# Patient Record
Sex: MALE | Race: WHITE | Employment: FULL TIME | URBAN - METROPOLITAN AREA
[De-identification: names, ages, dates, MRNs, and addresses within clinical notes are randomized per-mention and may not be internally consistent; named-entity substitution may affect disease eponyms.]

---

## 2018-05-16 ENCOUNTER — TRANSFERRED RECORDS (OUTPATIENT)
Dept: HEALTH INFORMATION MANAGEMENT | Facility: CLINIC | Age: 28
End: 2018-05-16

## 2019-01-24 ENCOUNTER — TELEPHONE (OUTPATIENT)
Dept: OTHER | Facility: CLINIC | Age: 29
End: 2019-01-24

## 2019-08-27 ENCOUNTER — OFFICE VISIT (OUTPATIENT)
Dept: INTERNAL MEDICINE | Facility: CLINIC | Age: 29
End: 2019-08-27
Payer: COMMERCIAL

## 2019-08-27 VITALS
DIASTOLIC BLOOD PRESSURE: 85 MMHG | HEART RATE: 71 BPM | WEIGHT: 162.6 LBS | BODY MASS INDEX: 21.55 KG/M2 | HEIGHT: 73 IN | SYSTOLIC BLOOD PRESSURE: 123 MMHG | RESPIRATION RATE: 18 BRPM | OXYGEN SATURATION: 98 % | TEMPERATURE: 97.8 F

## 2019-08-27 DIAGNOSIS — J45.909 UNCOMPLICATED ASTHMA, UNSPECIFIED ASTHMA SEVERITY, UNSPECIFIED WHETHER PERSISTENT: ICD-10-CM

## 2019-08-27 DIAGNOSIS — T78.2XXD ANAPHYLAXIS, SUBSEQUENT ENCOUNTER: ICD-10-CM

## 2019-08-27 DIAGNOSIS — Z00.00 WELLNESS EXAMINATION: ICD-10-CM

## 2019-08-27 DIAGNOSIS — Z00.00 WELLNESS EXAMINATION: Primary | ICD-10-CM

## 2019-08-27 DIAGNOSIS — J45.30 MILD PERSISTENT ASTHMA, UNSPECIFIED WHETHER COMPLICATED: ICD-10-CM

## 2019-08-27 RX ORDER — EPINEPHRINE 0.3 MG/.3ML
0.3 INJECTION SUBCUTANEOUS PRN
Qty: 1 EACH | Refills: 3 | Status: SHIPPED | OUTPATIENT
Start: 2019-08-27

## 2019-08-27 RX ORDER — ALBUTEROL SULFATE 90 UG/1
2 AEROSOL, METERED RESPIRATORY (INHALATION) EVERY 6 HOURS
Qty: 1 INHALER | Refills: 11 | Status: SHIPPED | OUTPATIENT
Start: 2019-08-27 | End: 2021-09-10

## 2019-08-27 RX ORDER — FLUTICASONE PROPIONATE 50 MCG
1 SPRAY, SUSPENSION (ML) NASAL DAILY
Qty: 9.9 ML | Refills: 1 | Status: SHIPPED | OUTPATIENT
Start: 2019-08-27 | End: 2019-10-27

## 2019-08-27 RX ORDER — CETIRIZINE HYDROCHLORIDE 10 MG/1
10 TABLET ORAL DAILY
Qty: 30 TABLET | Refills: 1 | Status: SHIPPED | OUTPATIENT
Start: 2019-08-27 | End: 2019-10-31

## 2019-08-27 RX ORDER — FLUTICASONE PROPIONATE 50 MCG
1 SPRAY, SUSPENSION (ML) NASAL DAILY
COMMUNITY
End: 2019-08-27

## 2019-08-27 RX ORDER — CETIRIZINE HYDROCHLORIDE 10 MG/1
10 TABLET ORAL DAILY
COMMUNITY
End: 2019-08-27

## 2019-08-27 ASSESSMENT — PAIN SCALES - GENERAL: PAINLEVEL: NO PAIN (0)

## 2019-08-27 ASSESSMENT — MIFFLIN-ST. JEOR: SCORE: 1756.43

## 2019-08-27 NOTE — PROGRESS NOTES
PRIMARY CARE CENTER         HPI:       Justin Joyce is a 28 year old male with history of mild persistent asthma and previously diagnosed hyperlipidemia (not currently treated) who presents to clinic for: Establish Care and Refill Request    Patient has been getting care from [facility] as he was previously a  at [employer], however, he recently graduated and is now employed as a post-doc here and would like to establish care at the [facility].  He has no acute complaints today, but does note that he is traveling to [place] in a few months with his wife for two weeks and he is wondering what immunizations he might need.      He is in a monogamous relationship with his wife, but would still like to obtain STD testing.  He rides is bike to work for exercise and eats a relatively balanced diet.     He also notes a history of severe peanut allergies, severe environmental allergies for which he has had to get allergy shots in the past and various other allergies.  He carries an epi pen with him.  He recently ate fish accidentally (despite a seafood allergy), so he was wondering if he could get special allergy testing to determine if he is still allergic to seafood.     He was diagnosed with LD in his earlier 20's and was taking a statin for a period of time, however, stopped taking this medicine after repeat lipids were normal.  He thinks he has several male family members on his maternal side who  <51 yo from cardiac issues.     PMHx:  Past Medical History:   Diagnosis Date     Food allergies      Hyperlipidaemia LDL goal < 160      Mild persistent asthma      Pleurisy     hospitalized for 2 days     Seasonal allergies        PSHx:  Past Surgical History:   Procedure Laterality Date     HC TOOTH EXTRACTION W/FORCEP         FamHx:  Family History   Problem Relation Age of Onset     Lipids Father      Asthma Father      Allergies Father      Lipids Paternal Grandmother      Cancer Paternal  Grandmother         ovarian     Allergies Mother      Cancer Maternal Grandmother      CHAR Maternal Grandfather      Cancer Maternal Grandfather         lung     Allergies:     Allergies   Allergen Reactions     Bees Anaphylaxis     Nuts Anaphylaxis     Seafood Anaphylaxis     Orange Hives     Penicillins Unknown     Seasonal Allergies Itching       Meds:    Current Outpatient Medications:      albuterol (PROAIR HFA/PROVENTIL HFA/VENTOLIN HFA) 108 (90 Base) MCG/ACT inhaler, Inhale 2 puffs into the lungs every 6 hours, Disp: 1 Inhaler, Rfl: 11     cetirizine (ZYRTEC) 10 MG tablet, Take 1 tablet (10 mg) by mouth daily, Disp: 30 tablet, Rfl: 1     EPINEPHrine (EPIPEN/ADRENACLICK/OR ANY BX GENERIC EQUIV) 0.3 MG/0.3ML injection 2-pack, Inject 0.3 mLs (0.3 mg) into the muscle as needed for anaphylaxis, Disp: 1 each, Rfl: 3     fluticasone (FLONASE) 50 MCG/ACT nasal spray, Spray 1 spray into both nostrils daily, Disp: 9.9 mL, Rfl: 1     fluticasone-salmeterol (ADVAIR DISKUS) 100-50 MCG/DOSE inhaler, Inhale 1 puff into the lungs every 12 hours, Disp: 1 Inhaler, Rfl: 11  Hydrocortisone OTC (uses on face)    SocHx:  Social History     Socioeconomic History     Marital status: Single     Spouse name: Not on file     Number of children: Not on file     Years of education: Not on file     Highest education level: Not on file   Occupational History     Not on file   Social Needs     Financial resource strain: Not on file     Food insecurity:     Worry: Not on file     Inability: Not on file     Transportation needs:     Medical: Not on file     Non-medical: Not on file   Tobacco Use     Smoking status: Never Smoker     Smokeless tobacco: Never Used   Substance and Sexual Activity     Alcohol use: Yes     Drug use: No     Sexual activity: Not on file   Lifestyle     Physical activity:     Days per week: Not on file     Minutes per session: Not on file     Stress: Not on file   Relationships     Social connections:     Talks on  "phone: Not on file     Gets together: Not on file     Attends Orthodoxy service: Not on file     Active member of club or organization: Not on file     Attends meetings of clubs or organizations: Not on file     Relationship status: Not on file     Intimate partner violence:     Fear of current or ex partner: Not on file     Emotionally abused: Not on file     Physically abused: Not on file     Forced sexual activity: Not on file   Other Topics Concern     Parent/sibling w/ CABG, MI or angioplasty before 65F 55M? Not Asked   Social History Narrative    Moved from [place] 2012. 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       Problem, Medication and Allergy Lists were reviewed and are current.  Patient is a new patient to this clinic and so  I reviewed/updated the Past Medical History, the Family History and the Social History.          Review of Systems:   ROS  I have personally reviewed and updated the complete ROS on the day of the visit.           Physical Exam:   /85 (BP Location: Right arm, Patient Position: Sitting, Cuff Size: Adult Regular)   Pulse 71   Temp 97.8  F (36.6  C) (Oral)   Resp 18   Ht 1.854 m (6' 1\")   Wt 73.8 kg (162 lb 9.6 oz)   SpO2 98%   BMI 21.45 kg/m    Body mass index is 21.45 kg/m .  Vitals were reviewed       GENERAL APPEARANCE: healthy, alert and no distress     EYES: EOMI,  PERRL, wearing prescription glasses     HENT: ear canals and TM's normal and nose and mouth without ulcers or lesions     NECK: no adenopathy, no asymmetry, masses, or scars and thyroid normal to palpation     RESP: lungs clear to auscultation - no rales, rhonchi or wheezes     CV: regular rates and rhythm, normal S1 S2, no S3 or S4 and no murmur, click or rub     ABDOMEN:  soft, nontender, no HSM or masses and bowel sounds normal     MS: extremities normal- no gross deformities noted, no evidence of inflammation in joints, FROM in all extremities.     SKIN: no suspicious lesions or rashes     NEURO: Normal " "strength and tone, sensory exam grossly normal, mentation intact and speech normal     PSYCH: mentation appears normal. and affect normal/bright     LYMPHATICS: No cervical adenopathy        Results:     Office Visit on 06/11/2015   Component Date Value Ref Range Status     Copath Report 06/11/2015    Final                    Value:Patient Name: PRAKASH GODFREY  MR#: [id number]  Specimen #: [id number]  Collected: 6/11/2015  Received: 6/11/2015  Reported: 6/12/2015 15:26  Ordering Phy(s): ROBBIE OH    SPECIMEN(S):  Right flank    FINAL DIAGNOSIS:  Skin, flank, right:       - Angiokeratoma and intracorneal hemorrhage - (see description).    I have personally reviewed all specimens and or slides, including the  listed special stains, and used them with my medical judgement to  determine the final diagnosis.    Electronically signed out by:    Camreon Noble D.O., [facility]    CLINICAL HISTORY:  The patient is a 24-year-old man.    GROSS:  The specimen is received in formalin with proper patient identification,  labeled \"R. Flank\" and the specimen consists of a single, irregular skin  shave measuring 0.9 x 0.7 cm.  The skin surface displays a 0.6 x 0.4 cm  red-tan lesion.  The resection margin is inked black.  It is serially  sectioned and entirely submitted in cassette 1. (Dictated by: Nicky abreu 6/11/2015 05:29 PM)    MICROSCOPIC:  Dilated capillaries are present in the uppermost dermis.  These are  partially enclosed and surrounded by elongated rete ridges.  The  features are those of an angiokeratoma. Additionally, there is prominent  intracorneal hemorrhage and fibrin deposition.  There is no evidence of  malignancy.    CPT Codes:  A: 49559-IE6.T, 80004-KB3.P    TESTING LAB LOCATION:  [facility], [address]   [id number] [phone]    COLLECTION SITE:  Client: [facility]" [facility] [place]  Location: [facility] ([place])         Lab Results   Component Value Date    WBC 7.0 03/28/2015    HGB 13.9 03/28/2015    HCT 41.0 03/28/2015     03/28/2015     03/28/2015    POTASSIUM 3.1 (L) 03/28/2015    CHLORIDE 113 (H) 03/28/2015    CO2 21 03/28/2015    BUN 10 03/28/2015    CR 0.82 03/28/2015    GLC 82 03/28/2015    DD <0.3 03/28/2015       Assessment and Plan     Justin Joyce is a 28 year old male with history of mild persistent asthma and previously diagnosed hyperlipidemia (not currently treated) who presents to clinic to establish care, have a physical and for prescription medicine refill.     Diagnoses and all orders for this visit:    # Wellness examination  Normal physical exam.  Refilled prescriptions.  Counseled on continued healthy living habits.    -     Lipid Profile FASTING; Future  -     CBC with platelets; Future  -     Basic metabolic panel; Future  -     N. gonorrhea PCR - Urine, Clinic Collect  -     Chlamydia trachomatis PCR; Future    # Anaphylaxis, h/o  -     EPINEPHrine (EPIPEN/ADRENACLICK/OR ANY BX GENERIC EQUIV) 0.3 MG/0.3ML injection 2-pack; Inject 0.3 mLs (0.3 mg) into the muscle as needed for anaphylaxis  -     ALLERGY/ASTHMA ADULT REFERRAL    # Mild persistent asthma, unspecified whether complicated  Well controlled on current regimen--uses albuterol inhaler about once per week.      -     albuterol (PROAIR HFA/PROVENTIL HFA/VENTOLIN HFA) 108 (90 Base) MCG/ACT inhaler; Inhale 2 puffs into the lungs every 6 hours  -     cetirizine (ZYRTEC) 10 MG tablet; Take 1 tablet (10 mg) by mouth daily  -     fluticasone (FLONASE) 50 MCG/ACT nasal spray; Spray 1 spray into both nostrils daily  -     fluticasone-salmeterol (ADVAIR DISKUS) 100-50 MCG/DOSE inhaler; Inhale 1 puff into the lungs every 12 hours    # Vaccinations for international travel  Patient going to [place] for 2 weeks.  Since this is less than one month, we discussed that Guamanian  Encephalitis virus and malaria vaccination/ppx would not be necessary.  -     TYPHOID VACCINE, IM  -     HEPATITIS A VACCINE, ADULT    Options for treatment and follow-up care were reviewed with the patient. Justin Joyce engaged in the decision making process and verbalized understanding of the options discussed and agreed with the final plan.    Kings Macias, DO  Aug 27, 2019    Pt was seen and plan of care discussed with Sandra.     Teaching Physician Note:  I was present during the visit and the patient was seen and examined by me.   I discussed the case with the resident and agree with the note as documented by the resident with the following exceptions:  None.    Breana Flores M.D.  Internal Medicine   pager [phone]

## 2019-08-27 NOTE — NURSING NOTE
Chief Complaint   Patient presents with     Establish Care     Refill Request       Terry Ragland CMA (AAMA) at 8:44 AM on 8/27/2019

## 2019-08-27 NOTE — LETTER
Patient:  Justin Joyce  :   1990  MRN:     [id number]        Mr. Justin Joyce  [address]   [place] [address]        September 3, 2019    Dear Mr. Joyce,    Thank you for choosing the [facility] for your healthcare needs.  We appreciate the opportunity to serve you.    The following are your recent test results:  Your gonorrhea test result is negative.     Results for orders placed or performed in visit on 19   N. gonorrhea PCR - Urine, Clinic Collect   Result Value Ref Range    Specimen Descrip Urine     N Gonorrhea PCR Negative NEG^Negative         Please contact your provider if you have any questions or concerns.  We look forward to serving your needs in the future.      Sincerely,    Dr. Flores

## 2019-08-27 NOTE — PROGRESS NOTES
Patient received Hepatitis A vaccine. Vaccine was given under the supervision of Dr. Philippe. See immunization list for administration details. Pt was advised to remain in [facility] for 15 minutes in case of an averse reaction. Given by Trina Clarke CMA, EMT 10:17 AM 8/27/2019    Patient received Typhoid vaccine. Vaccine was given under the supervision of Dr. Philippe. See immunization list for administration details. Pt was advised to remain in [facility] lobby for 15 minutes in case of an averse reaction. Given by Trina Clarke CMA, EMT 10:20 AM 8/27/2019

## 2019-08-28 LAB
C TRACH DNA SPEC QL NAA+PROBE: NEGATIVE
N GONORRHOEA DNA SPEC QL NAA+PROBE: NEGATIVE
SPECIMEN SOURCE: NORMAL
SPECIMEN SOURCE: NORMAL

## 2019-08-29 NOTE — TELEPHONE ENCOUNTER
FUTURE VISIT INFORMATION      FUTURE VISIT INFORMATION:    Date: 10.8.19    Time: 9:00 am    Location:  Allergy  REFERRAL INFORMATION:    Referring provider:  Dr. Kings Macias    Referring providers clinic:  [facility]    Reason for visit/diagnosis:  Anaphylaxis, history of Asthma, appt per Chery at [facility] and pt    RECORDS REQUESTED FROM:       Clinic name Comments Records Status Imaging Status   [facility] 8.27.19 Dr. Macias In Cards Off    [facility] 5.16.18 In [facility]                                Action Radha Corcoran 8.29.19 10:10 am   Action Taken Faxed [facility] for records

## 2019-09-04 ENCOUNTER — TELEPHONE (OUTPATIENT)
Dept: INTERNAL MEDICINE | Facility: CLINIC | Age: 29
End: 2019-09-04

## 2019-09-04 NOTE — TELEPHONE ENCOUNTER
Health Call Center    Phone Message    May a detailed message be left on voicemail: no    Reason for Call: Requesting Results   Name/type of test: Labs done at physical exam  Date of test: 8/27/19  Was test done at a location other than [facility] (Please fill in the location if not [facility])?: No    **Patient requesting a call back to discuss lab results from recent physical. Please contact patient.**    Action Taken: Message routed to:  [facility] ([facility] LEXII

## 2019-09-05 DIAGNOSIS — Z00.00 WELLNESS EXAMINATION: ICD-10-CM

## 2019-09-05 LAB
ANION GAP SERPL CALCULATED.3IONS-SCNC: 6 MMOL/L (ref 3–14)
BUN SERPL-MCNC: 15 MG/DL (ref 7–30)
CALCIUM SERPL-MCNC: 9.4 MG/DL (ref 8.5–10.1)
CHLORIDE SERPL-SCNC: 107 MMOL/L (ref 94–109)
CHOLEST SERPL-MCNC: 261 MG/DL
CO2 SERPL-SCNC: 27 MMOL/L (ref 20–32)
CREAT SERPL-MCNC: 0.91 MG/DL (ref 0.66–1.25)
ERYTHROCYTE [DISTWIDTH] IN BLOOD BY AUTOMATED COUNT: 12.8 % (ref 10–15)
GFR SERPL CREATININE-BSD FRML MDRD: >90 ML/MIN/{1.73_M2}
GLUCOSE SERPL-MCNC: 87 MG/DL (ref 70–99)
HCT VFR BLD AUTO: 49.2 % (ref 40–53)
HDLC SERPL-MCNC: 52 MG/DL
HGB BLD-MCNC: 16.2 G/DL (ref 13.3–17.7)
LDLC SERPL CALC-MCNC: 187 MG/DL
MCH RBC QN AUTO: 28.2 PG (ref 26.5–33)
MCHC RBC AUTO-ENTMCNC: 32.9 G/DL (ref 31.5–36.5)
MCV RBC AUTO: 86 FL (ref 78–100)
NONHDLC SERPL-MCNC: 209 MG/DL
PLATELET # BLD AUTO: 195 10E9/L (ref 150–450)
POTASSIUM SERPL-SCNC: 4.2 MMOL/L (ref 3.4–5.3)
RBC # BLD AUTO: 5.74 10E12/L (ref 4.4–5.9)
SODIUM SERPL-SCNC: 140 MMOL/L (ref 133–144)
TRIGL SERPL-MCNC: 111 MG/DL
WBC # BLD AUTO: 6.5 10E9/L (ref 4–11)

## 2019-09-05 NOTE — TELEPHONE ENCOUNTER
Called and advised negative. Todays labs are not  Reviewed by MD yet. Charo Lindsey Paramedic on 9/5/2019 at 10:14 AM

## 2019-10-08 ENCOUNTER — PRE VISIT (OUTPATIENT)
Dept: ALLERGY | Facility: CLINIC | Age: 29
End: 2019-10-08

## 2019-10-08 DIAGNOSIS — J45.30 MILD PERSISTENT ASTHMA, UNSPECIFIED WHETHER COMPLICATED: Primary | ICD-10-CM

## 2019-10-08 LAB
DLCOUNC-%PRED-PRE: 100 %
DLCOUNC-PRE: 36.73 ML/MIN/MMHG
DLCOUNC-PRED: 36.41 ML/MIN/MMHG
ERV-%PRED-PRE: 96 %
ERV-PRE: 2.24 L
ERV-PRED: 2.33 L
EXPTIME-PRE: 7.12 SEC
FEF2575-%PRED-PRE: 94 %
FEF2575-PRE: 4.76 L/SEC
FEF2575-PRED: 5.02 L/SEC
FEFMAX-%PRED-PRE: 120 %
FEFMAX-PRE: 13.29 L/SEC
FEFMAX-PRED: 11.06 L/SEC
FEV1-%PRED-PRE: 99 %
FEV1-PRE: 4.98 L
FEV1FEV6-PRE: 82 %
FEV1FEV6-PRED: 83 %
FEV1FVC-PRE: 82 %
FEV1FVC-PRED: 82 %
FEV1SVC-PRE: 84 %
FEV1SVC-PRED: 80 %
FIFMAX-PRE: 10.58 L/SEC
FRCPLETH-%PRED-PRE: 106 %
FRCPLETH-PRE: 3.77 L
FRCPLETH-PRED: 3.54 L
FVC-%PRED-PRE: 98 %
FVC-PRE: 6.07 L
FVC-PRED: 6.15 L
IC-%PRED-PRE: 92 %
IC-PRE: 3.71 L
IC-PRED: 3.99 L
RVPLETH-%PRED-PRE: 82 %
RVPLETH-PRE: 1.54 L
RVPLETH-PRED: 1.85 L
TLCPLETH-%PRED-PRE: 95 %
TLCPLETH-PRE: 7.48 L
TLCPLETH-PRED: 7.84 L
VA-%PRED-PRE: 100 %
VA-PRE: 7.49 L
VC-%PRED-PRE: 94 %
VC-PRE: 5.94 L
VC-PRED: 6.32 L

## 2019-10-31 DIAGNOSIS — J45.30 MILD PERSISTENT ASTHMA, UNSPECIFIED WHETHER COMPLICATED: ICD-10-CM

## 2019-10-31 RX ORDER — CETIRIZINE HYDROCHLORIDE 10 MG/1
TABLET ORAL
Qty: 30 TABLET | Refills: 1 | Status: SHIPPED | OUTPATIENT
Start: 2019-10-31 | End: 2021-04-22

## 2019-10-31 NOTE — TELEPHONE ENCOUNTER
cetirizine (ZYRTEC) 10 MG tablet      Last Written Prescription Date:  8/27/19  Last Fill Quantity: 30,   # refills: 1  Last Office Visit : 8/27/19  Future Office visit: none    Refill to pharmacy.

## 2020-03-02 ENCOUNTER — HEALTH MAINTENANCE LETTER (OUTPATIENT)
Age: 30
End: 2020-03-02

## 2020-07-06 ENCOUNTER — MYC REFILL (OUTPATIENT)
Dept: INTERNAL MEDICINE | Facility: CLINIC | Age: 30
End: 2020-07-06

## 2020-07-06 DIAGNOSIS — J45.30 MILD PERSISTENT ASTHMA, UNSPECIFIED WHETHER COMPLICATED: ICD-10-CM

## 2020-07-06 RX ORDER — ALBUTEROL SULFATE 90 UG/1
2 AEROSOL, METERED RESPIRATORY (INHALATION) EVERY 6 HOURS
Qty: 1 INHALER | Refills: 11 | Status: CANCELLED | OUTPATIENT
Start: 2020-07-06

## 2020-07-28 ENCOUNTER — VIRTUAL VISIT (OUTPATIENT)
Dept: FAMILY MEDICINE | Facility: OTHER | Age: 30
End: 2020-07-28
Payer: COMMERCIAL

## 2020-07-28 PROCEDURE — 99421 OL DIG E/M SVC 5-10 MIN: CPT | Performed by: PHYSICIAN ASSISTANT

## 2020-07-28 NOTE — PROGRESS NOTES
"Date: 2020 11:49:32  Clinician: Demond Mendez  Clinician NPI: [id number]  Patient: Justin Joyce  Patient : 1990  Patient Address: [facility] [address], [place] [address]  Patient Phone: [phone]  Visit Protocol: URI  Patient Summary:  Justin is a 29 year old ( : 1990 ) male who initiated a Visit for COVID-19 (Coronavirus) evaluation and screening. When asked the question \"Please sign me up to receive news, health information and promotions from [employer].\", Justin responded \"No\".    Justin states his symptoms started 1-2 days ago.   His symptoms consist of nasal congestion, rhinitis, malaise, and chills.   Symptom details   Nasal secretions: The color of his mucus is clear and white.   Justin denies having wheezing, nausea, teeth pain, ageusia, diarrhea, myalgias, sore throat, anosmia, facial pain or pressure, fever, cough, vomiting, ear pain, headache, and enlarged lymph nodes. He also denies having recent facial or sinus surgery in the past 60 days and taking antibiotic medication in the past month. He is not experiencing dyspnea.   Precipitating events  He has not recently been exposed to someone with influenza. Justin has not been in close contact with any high risk individuals.   Pertinent COVID-19 (Coronavirus) information  In the past 14 days, Justin has not worked in a congregate living setting.   He does not work or volunteer as healthcare worker or a  and does not work or volunteer in a healthcare facility.   Justin also has not lived in a congregate living setting in the past 14 days. He does not live with a healthcare worker.   Justin has not had a close contact with a laboratory-confirmed COVID-19 patient within 14 days of symptom onset.   Pertinent medical history  Justin needs a return to work/school note.   Weight: 160 lbs   Justin does not smoke or use smokeless tobacco.   Weight: 160 lbs    MEDICATIONS: albuterol sulfate inhalation, cetirizine " "oral, Advair Diskus inhalation, ALLERGIES: Penicillins  Clinician Response:  Dear Justin,   Your symptoms show that you may have coronavirus (COVID-19). This illness can cause fever, cough and trouble breathing. Many people get a mild case and get better on their own. Some people can get very sick.  What should I do?  We would like to test you for this virus.   1. Please call [phone] to schedule your visit. Explain that you were referred by [facility] to have a COVID-19 test. Be ready to share your [facility] visit ID number.  The following will serve as your written order for this COVID Test, ordered by me, for the indication of suspected COVID [Z20.828]: The test will be ordered in [employer], our electronic health record, after you are scheduled. It will show as ordered and authorized by Otilio Broussard MD.  Order: COVID-19 (Coronavirus) PCR for SYMPTOMATIC testing from [facility].      2. When it's time for your COVID test:  Stay at least 6 feet away from others. (If someone will drive you to your test, stay in the backseat, as far away from the  as you can.)   Cover your mouth and nose with a mask, tissue or washcloth.  Go straight to the testing site. Don't make any stops on the way there or back.      3.Starting now: Stay home and away from others (self-isolate) until:   You've had no fever---and no medicine that reduces fever---for 3 full days (72 hours). And...   Your other symptoms have gotten better. For example, your cough or breathing has improved. And...   At least 10 days have passed since your symptoms started.       During this time, don't leave the house except for testing or medical care.   Stay in your own room, even for meals. Use your own bathroom if you can.   Stay away from others in your home. No hugging, kissing or shaking hands. No visitors.  Don't go to work, school or anywhere else.    Clean \"high touch\" surfaces often (doorknobs, counters, handles, etc.). Use a household cleaning spray or wipes. " You'll find a full list of  on the EPA website: www.epa.gov/pesticide-registration/list-n-disinfectants-use-against-sars-cov-2.   Cover your mouth and nose with a mask, tissue or washcloth to avoid spreading germs.  Wash your hands and face often. Use soap and water.  Caregivers in these groups are at risk for severe illness due to COVID-19:  o People 65 years and older  o People who live in a nursing home or long-term care facility  o People with chronic disease (lung, heart, cancer, diabetes, kidney, liver, immunologic)  o People who have a weakened immune system, including those who:   Are in cancer treatment  Take medicine that weakens the immune system, such as corticosteroids  Had a bone marrow or organ transplant  Have an immune deficiency  Have poorly controlled HIV or AIDS  Are obese (body mass index of 40 or higher)  Smoke regularly   o Caregivers should wear gloves while washing dishes, handling laundry and cleaning bedrooms and bathrooms.  o Use caution when washing and drying laundry: Don't shake dirty laundry, and use the warmest water setting that you can.  o For more tips, go to www.cdc.gov/coronavirus/2019-ncov/downloads/10Things.pdf.    4.Sign up for [employer]. We know it's scary to hear that you might have COVID-19. We want to track your symptoms to make sure you're okay over the next 2 weeks. Please look for an email from [employer]---this is a free, online program that we'll use to keep in touch. To sign up, follow the link in the email. Learn more at [email or web address]  How can I take care of myself?   Get lots of rest. Drink extra fluids (unless a doctor has told you not to).   Take Tylenol (acetaminophen) for fever or pain. If you have liver or kidney problems, ask your family doctor if it's okay to take Tylenol.   Adults can take either:    650 mg (two 325 mg pills) every 4 to 6 hours, or...   1,000 mg (two 500 mg pills) every 8 hours as needed.    Note: Don't  take more than 3,000 mg in one day. Acetaminophen is found in many medicines (both prescribed and over-the-counter medicines). Read all labels to be sure you don't take too much.   For children, check the Tylenol bottle for the right dose. The dose is based on the child's age or weight.    If you have other health problems (like cancer, heart failure, an organ transplant or severe kidney disease): Call your specialty clinic if you don't feel better in the next 2 days.       Know when to call 911. Emergency warning signs include:    Trouble breathing or shortness of breath Pain or pressure in the chest that doesn't go away Feeling confused like you haven't felt before, or not being able to wake up Bluish-colored lips or face.  Where can I get more information?   [facility] -- About COVID-19: [email or web address]   CDC -- What to Do If You're Sick: www.cdc.gov/coronavirus/2019-ncov/about/steps-when-sick.html   CDC -- Ending Home Isolation: www.cdc.gov/coronavirus/2019-ncov/hcp/disposition-in-home-patients.html   CDC -- Caring for Someone: www.cdc.gov/coronavirus/2019-ncov/if-you-are-sick/care-for-someone.html   [facility] -- Interim Guidance for Hospital Discharge to Home: www.health.[facility].[place]/diseases/coronavirus/hcp/hospdischarge.pdf   [facility] clinical trials (COVID-19 research studies): clinicalaffairs.[facility]-clinical-trials    Below are the COVID-19 hotlines at the [facility] of [facility] ([facility]). Interpreters are available.    For health questions: Call 649-337-9651 or 1-845.990.1981 (7 a.m. to 7 p.m.) For questions about schools and childcare: Call 266-863-8582 or 1-546.290.4277 (7 a.m. to 7 p.m.)    Diagnosis: Nasal congestion  Diagnosis ICD: R09.81

## 2020-07-29 DIAGNOSIS — Z20.822 SUSPECTED 2019 NOVEL CORONAVIRUS INFECTION: Primary | ICD-10-CM

## 2020-07-30 LAB
SARS-COV-2 RNA SPEC QL NAA+PROBE: NOT DETECTED
SPECIMEN SOURCE: NORMAL

## 2020-09-28 DIAGNOSIS — J45.30 MILD PERSISTENT ASTHMA, UNSPECIFIED WHETHER COMPLICATED: ICD-10-CM

## 2020-09-30 NOTE — TELEPHONE ENCOUNTER
fluticasone-salmeterol (ADVAIR DISKUS) 100-50 MCG/DOSE inhaler   Last Written Prescription Date:  10/29/2019  Last Fill Quantity: 16,   # refills: 3  Last Office Visit : 8/27/2019  Future Office visit:  10/6/2020  1 Inhaler and 1 Refill sent to pharm 9/30/2020      Jeanine Lloyd RN  Central Triage Red Flags/Med Refills

## 2020-10-06 ENCOUNTER — OFFICE VISIT (OUTPATIENT)
Dept: INTERNAL MEDICINE | Facility: CLINIC | Age: 30
End: 2020-10-06
Payer: COMMERCIAL

## 2020-10-06 VITALS
BODY MASS INDEX: 21.34 KG/M2 | OXYGEN SATURATION: 99 % | HEIGHT: 73 IN | DIASTOLIC BLOOD PRESSURE: 85 MMHG | HEART RATE: 60 BPM | SYSTOLIC BLOOD PRESSURE: 131 MMHG | WEIGHT: 161 LBS

## 2020-10-06 DIAGNOSIS — N52.9 ERECTILE DYSFUNCTION, UNSPECIFIED ERECTILE DYSFUNCTION TYPE: ICD-10-CM

## 2020-10-06 DIAGNOSIS — Z00.00 ROUTINE GENERAL MEDICAL EXAMINATION AT A HEALTH CARE FACILITY: Primary | ICD-10-CM

## 2020-10-06 PROCEDURE — 99395 PREV VISIT EST AGE 18-39: CPT | Mod: 25 | Performed by: STUDENT IN AN ORGANIZED HEALTH CARE EDUCATION/TRAINING PROGRAM

## 2020-10-06 RX ORDER — SILDENAFIL 25 MG/1
25 TABLET, FILM COATED ORAL DAILY PRN
Status: CANCELLED | OUTPATIENT
Start: 2020-10-06

## 2020-10-06 RX ORDER — FLUORIDE TOOTHPASTE
237 TOOTHPASTE DENTAL 4 TIMES DAILY
Qty: 1 BOTTLE | Refills: 0 | Status: CANCELLED | OUTPATIENT
Start: 2020-10-06

## 2020-10-06 ASSESSMENT — MIFFLIN-ST. JEOR: SCORE: 1744.17

## 2020-10-06 ASSESSMENT — PAIN SCALES - GENERAL: PAINLEVEL: NO PAIN (0)

## 2020-10-06 NOTE — PROGRESS NOTES
[facility]         HPI:       Justin Joyce is a 29 year old male who presents to clinic for: Physical    Patient has 2 main concerns today.  First concern is that he sleeps with his mouth wide open.  He states that his wife is very concerned about how wide open his mouth is, and indeed he feels like he has a dry mouth in the morning.  He says that his dentist thinks that he has early periodontal disease.  Apparently his dentist did not provide any advice on strategies to address mouth breathing during sleep.  Patient has actually bought himself a head strap over the Internet but this does not fit well and has not helped.  He already wears a mouthguard at night.    His second issue is erectile dysfunction.  He states that over the past year he has had increasingly difficult time achieving and maintaining erections.  He has actually had intermittent problems with this for the past 7 years roughlt.  He is able to maintain an erection sufficient for sexual intercourse with his wife only about 50% of the time.  He also notes that sometimes he will have a half flaccid penis but still be able to ejaculate, which worries him.  He does have morning erections.  He Tried sildenafil intermittently for one year about 6 or 7 years ago.  This did help.  He is not interested in sildenafil anymore.  He suspects that there is a significant psychological component to his erectile dysfunction.  He and his wife go to couples counseling and discussed this issue as part of that.  They tend to have sex about twice per week.    Patient denies significant fatigue, headache, visual changes, hearing problems, headache, fever, chills, chest pain, shortness of breath, abdominal pain, dysuria, pain with urination, painful erections, pain with ejaculation, deviated erections, constipation or diarrhea, numbness or tingling in his feet or hands.      PMHx:  Past Medical History:   Diagnosis Date     Food allergies       Hyperlipidaemia LDL goal < 160      Mild persistent asthma      Pleurisy 2012    hospitalized for 2 days     Seasonal allergies        PSHx:  Past Surgical History:   Procedure Laterality Date     HC TOOTH EXTRACTION W/FORCEP         FamHx:  Family History   Problem Relation Age of Onset     Lipids Father      Asthma Father      Allergies Father      Lipids Paternal Grandmother      Cancer Paternal Grandmother         ovarian     Allergies Mother      Cancer Maternal Grandmother      [email or web address].DSb Maternal Grandfather      Cancer Maternal Grandfather         lung       Allergies:     Allergies   Allergen Reactions     Bees Anaphylaxis     Nuts Anaphylaxis     Seafood Anaphylaxis     Orange Hives     Penicillins Unknown     Seasonal Allergies Itching       Meds:    Current Outpatient Medications:      albuterol (PROAIR HFA/PROVENTIL HFA/VENTOLIN HFA) 108 (90 Base) MCG/ACT inhaler, Inhale 2 puffs into the lungs every 6 hours, Disp: 1 Inhaler, Rfl: 11     cetirizine (ZYRTEC) 10 MG tablet, TAKE 1 TABLET BY MOUTH EVERY DAY, Disp: 30 tablet, Rfl: 1     EPINEPHrine (EPIPEN/ADRENACLICK/OR ANY BX GENERIC EQUIV) 0.3 MG/0.3ML injection 2-pack, Inject 0.3 mLs (0.3 mg) into the muscle as needed for anaphylaxis, Disp: 1 each, Rfl: 3     fluticasone (FLONASE) 50 MCG/ACT nasal spray, Spray 1 spray into both nostrils daily, Disp: 16 mL, Rfl: 3     fluticasone-salmeterol (ADVAIR DISKUS) 100-50 MCG/DOSE inhaler, Inhale 1 puff into the lungs every 12 hours *Please keep visit with PCP on 10/6/2020 for further refills*  Thank you, Disp: 1 Inhaler, Rfl: 1    SocHx:  Social History     Socioeconomic History     Marital status: Single     Spouse name: None     Number of children: None     Years of education: None     Highest education level: None   Occupational History     None   Social Needs     Financial resource strain: None     Food insecurity     Worry: None     Inability: None     Transportation needs     Medical: None     Non-medical: None  "  Tobacco Use     Smoking status: Never Smoker     Smokeless tobacco: Never Used   Substance and Sexual Activity     Alcohol use: Yes     Drug use: No     Sexual activity: None   Lifestyle     Physical activity     Days per week: None     Minutes per session: None     Stress: None   Relationships     Social connections     Talks on phone: None     Gets together: None     Attends Yazidi service: None     Active member of club or organization: None     Attends meetings of clubs or organizations: None     Relationship status: None     Intimate partner violence     Fear of current or ex partner: None     Emotionally abused: None     Physically abused: None     Forced sexual activity: None   Other Topics Concern     Parent/sibling w/ CABG, MI or angioplasty before 65F 55M? Not Asked   Social History Narrative    Moved from [place] 2012. 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       Problem, Medication and Allergy Lists were reviewed and are current.  Patient is an established patient of this clinic.         Review of Systems:   LEIA  I have personally reviewed and updated the complete ROS on the day of the visit.           Physical Exam:   /85   Pulse 60   Ht 1.854 m (6' 1\")   Wt 73 kg (161 lb)   SpO2 99%   BMI 21.24 kg/m    Body mass index is 21.24 kg/m .  Vitals were reviewed       GENERAL APPEARANCE: healthy, alert and no distress     EYES: EOMI,  PERRL     HENT: ear canals and TM's normal and nose and mouth without ulcers or lesions     NECK: no adenopathy, no asymmetry, masses, or scars and thyroid normal to palpation     RESP: lungs clear to auscultation - no rales, rhonchi or wheezes     CV: regular rates and rhythm, normal S1 S2, no S3 or S4 and no murmur, click or rub     ABDOMEN:  soft, nontender, no HSM or masses and bowel sounds normal     GU_male: testicles normal without atrophy or masses and penis normal without urethral discharge     MS: extremities normal- no gross deformities noted, no " evidence of inflammation in joints, FROM in all extremities.     SKIN: no suspicious lesions or rashes     NEURO: Normal strength and tone, sensory exam grossly normal, mentation intact and speech normal     PSYCH: mentation appears normal. and affect normal/bright     LYMPHATICS: No cervical adenopathy        Results:     Orders Only on 07/29/2020   Component Date Value Ref Range Status     COVID-19 Virus PCR to [facility] - So* 07/29/2020 Nasopharyngeal   Final     COVID-19 Virus PCR to [facility] - Re* 07/29/2020 Not Detected   Final    Comment: Collection of multiple specimens from the same patient may be necessary to   detect the virus. The possibility of a false negative should be considered if   the patient's recent exposure or clinical presentation suggests 2019 nCOV   infection and diagnostic tests for other causes of illness are negative.   Repeat testing may be considered in this setting.  Viral RNA was extracted via a validated method and subsequently underwent   single step reverse transcriptase-real time polymerase chain reaction using   primers to the CDC specified N1,N2 gene targets of CoV2 and human RNP as an   internal control.  A negative result does not rule out the presence of real-time PCR inhibitors   in the specimen or COVID-19 RNA in concentrations below the limit of detection   of the assay. The possibility of a false negative should be considered if the   patients recent exposure or clinical presentation suggests COVID-19.   Additional testing or repeat testing requires consultation with the   laborator                           y.  Nasopharyngeal specimen is the preferred choice for swab-based SARS CoV2   testing. When collection of a nasopharyngeal swab is not possible the   following are acceptable alternatives:  an oropharyngeal (OP) specimen collected by a healthcare professional, or a   nasal mid-turbinate (NMT) swab collected by a healthcare professional or by   onsite self-collection  (using a flocked tapered swab), or an anterior nares   specimen collected by a healthcare professional or by onsite self-collection   (using a round foam swab). (Centers for Disease Control)  Testing performed by [facility], Room [address], [address] [place] [address], [place] [address]. This test was developed and its   performance characteristics determined by the [facility]. It has not been cleared or approved by the FDA.  The laboratory is regulated under the Clinical Laboratory Improvement   Amendments of 1988 (CLIA-88) as qualified to perform high-complexity testin                           g.   This test is used for clinical purposes. It should not be regarded as   investigational or for research.         Lab Results   Component Value Date    WBC 6.6 10/07/2020    HGB 15.8 10/07/2020    HCT 47.1 10/07/2020     10/07/2020     10/07/2020    POTASSIUM 4.3 10/07/2020    CHLORIDE 107 10/07/2020    CO2 29 10/07/2020    BUN 14 10/07/2020    CR 1.00 10/07/2020    GLC 91 10/07/2020    DD <0.3 03/28/2015    AST 19 10/07/2020    ALT 27 10/07/2020    ALKPHOS 70 10/07/2020    BILITOTAL 0.8 10/07/2020       Assessment and Plan     Justin was seen today for physical.    Diagnoses and all orders for this visit:  Routine General Exam    Erectile dysfunction, unspecified erectile dysfunction type  Do not suspect that there was an organic etiology at play here.  Will obtain basic laboratory work-up, and patient will continue to see therapist.  He is uninterested in medication at this time.  -     Lipid panel reflex to direct LDL Fasting; Future  -     Testosterone Free and Total; Future  -     Comprehensive metabolic panel; Future  -     CBC with platelets; Future  -     TSH with free T4 reflex; Future    Options for treatment and follow-up care were reviewed with the patient. Justin Joyce engaged in the decision making process and verbalized understanding  of the options discussed and agreed with the final plan.    Kings Macias, DO  Oct 6, 2020    Pt was seen and plan of care discussed with Dr. Farnsworth.     While the patient was in clinic, I reviewed the pertinent medical history and results.  I discussed the current findings on physical examination, as well as the patient s diagnosis and treatment plan with the resident and agree with the information as documented with the following exceptions: none.  Judie Farnsworth MD  Internal Medicine

## 2020-10-06 NOTE — NURSING NOTE
Chief Complaint   Patient presents with     Physical     Bel Gillis LPN at 10:17 AM on 10/6/2020.

## 2020-10-07 DIAGNOSIS — N52.9 ERECTILE DYSFUNCTION, UNSPECIFIED ERECTILE DYSFUNCTION TYPE: ICD-10-CM

## 2020-10-07 LAB
ALBUMIN SERPL-MCNC: 4 G/DL (ref 3.4–5)
ALP SERPL-CCNC: 70 U/L (ref 40–150)
ALT SERPL W P-5'-P-CCNC: 27 U/L (ref 0–70)
ANION GAP SERPL CALCULATED.3IONS-SCNC: 4 MMOL/L (ref 3–14)
AST SERPL W P-5'-P-CCNC: 19 U/L (ref 0–45)
BILIRUB SERPL-MCNC: 0.8 MG/DL (ref 0.2–1.3)
BUN SERPL-MCNC: 14 MG/DL (ref 7–30)
CALCIUM SERPL-MCNC: 9.3 MG/DL (ref 8.5–10.1)
CHLORIDE SERPL-SCNC: 107 MMOL/L (ref 94–109)
CHOLEST SERPL-MCNC: 239 MG/DL
CO2 SERPL-SCNC: 29 MMOL/L (ref 20–32)
CREAT SERPL-MCNC: 1 MG/DL (ref 0.66–1.25)
ERYTHROCYTE [DISTWIDTH] IN BLOOD BY AUTOMATED COUNT: 12.7 % (ref 10–15)
GFR SERPL CREATININE-BSD FRML MDRD: >90 ML/MIN/{1.73_M2}
GLUCOSE SERPL-MCNC: 91 MG/DL (ref 70–99)
HCT VFR BLD AUTO: 47.1 % (ref 40–53)
HDLC SERPL-MCNC: 55 MG/DL
HGB BLD-MCNC: 15.8 G/DL (ref 13.3–17.7)
LDLC SERPL CALC-MCNC: 159 MG/DL
MCH RBC QN AUTO: 28.9 PG (ref 26.5–33)
MCHC RBC AUTO-ENTMCNC: 33.5 G/DL (ref 31.5–36.5)
MCV RBC AUTO: 86 FL (ref 78–100)
NONHDLC SERPL-MCNC: 184 MG/DL
PLATELET # BLD AUTO: 182 10E9/L (ref 150–450)
POTASSIUM SERPL-SCNC: 4.3 MMOL/L (ref 3.4–5.3)
PROT SERPL-MCNC: 7.5 G/DL (ref 6.8–8.8)
RBC # BLD AUTO: 5.47 10E12/L (ref 4.4–5.9)
SODIUM SERPL-SCNC: 140 MMOL/L (ref 133–144)
TRIGL SERPL-MCNC: 127 MG/DL
TSH SERPL DL<=0.005 MIU/L-ACNC: 1.81 MU/L (ref 0.4–4)
WBC # BLD AUTO: 6.6 10E9/L (ref 4–11)

## 2020-10-07 PROCEDURE — 80061 LIPID PANEL: CPT | Performed by: PATHOLOGY

## 2020-10-07 PROCEDURE — 99000 SPECIMEN HANDLING OFFICE-LAB: CPT | Performed by: PATHOLOGY

## 2020-10-07 PROCEDURE — 80053 COMPREHEN METABOLIC PANEL: CPT | Performed by: PATHOLOGY

## 2020-10-07 PROCEDURE — 84270 ASSAY OF SEX HORMONE GLOBUL: CPT | Mod: 90 | Performed by: PATHOLOGY

## 2020-10-07 PROCEDURE — 84443 ASSAY THYROID STIM HORMONE: CPT | Performed by: PATHOLOGY

## 2020-10-07 PROCEDURE — 85027 COMPLETE CBC AUTOMATED: CPT | Performed by: PATHOLOGY

## 2020-10-07 PROCEDURE — 84403 ASSAY OF TOTAL TESTOSTERONE: CPT | Mod: 90 | Performed by: PATHOLOGY

## 2020-10-07 ASSESSMENT — ASTHMA QUESTIONNAIRES: ACT_TOTALSCORE: 23

## 2020-10-08 ENCOUNTER — VIRTUAL VISIT (OUTPATIENT)
Dept: FAMILY MEDICINE | Facility: OTHER | Age: 30
End: 2020-10-08

## 2020-10-08 LAB
SHBG SERPL-SCNC: 34 NMOL/L (ref 11–80)
TESTOST FREE SERPL-MCNC: 15.89 NG/DL (ref 4.7–24.4)
TESTOST SERPL-MCNC: 721 NG/DL (ref 240–950)

## 2020-10-08 NOTE — PROGRESS NOTES
"Date: 10/08/2020 17:28:39  Clinician: Juan Ramirez  Clinician NPI: [id number]  Patient: Justin Joyce  Patient : 1990  Patient Address: [facility] [address], [place] [address]  Patient Phone: [phone]  Visit Protocol: URI  Patient Summary:  Justin is a 29 year old ( : 1990 ) male who initiated a [facility] Visit for COVID-19 (Coronavirus) evaluation and screening. When asked the question \"Please sign me up to receive news, health information and promotions. \", Justin responded \"No\".    When asked when his symptoms started, Justin reported that he does not have any symptoms.   He denies taking antibiotic medication in the past month and having recent facial or sinus surgery in the past 60 days.    Pertinent COVID-19 (Coronavirus) information  In the past 14 days, Justin has not worked in a congregate living setting.   He does not work or volunteer as healthcare worker or a  and does not work or volunteer in a healthcare facility.   Justin also has not lived in a congregate living setting in the past 14 days. He does not live with a healthcare worker.   Justin has not had a close contact with a laboratory-confirmed COVID-19 patient within the last 14 days.   Since 2019, Justin and has had upper respiratory infection (URI) or influenza-like illness. Has not been diagnosed with lab-confirmed COVID-19 test      Date(s) of previous URI or influenza-like illness (free-text): February 15, 2020     Symptoms Justin experienced during previous URI or influenza-like illness as reported by the patient (free-text): Fever, Cough, Fatigue        Pertinent medical history  Justin needs a return to work/school note.   Weight: 162 lbs   Justin does not smoke or use smokeless tobacco.   Weight: 162 lbs    MEDICATIONS: albuterol sulfate inhalation, cetirizine oral, Advair Diskus inhalation, ALLERGIES: Penicillins  Clinician Response:  Deahero Anderson,   Based on the details you've " shared, you are concerned about contact with someone who may have been exposed to coronavirus (COVID-19). Based on [facility] guidelines you do not need to be tested at this time.  Testing for people who do not have symptoms is only required in certain instances, including direct contact with a person who has tested positive for COVID-19, or for those who are traveling to locations where testing is required beforehand.  Please redo an [facility] visit or call your clinic if you think we missed needed information, your exposure information has changed, or you develop symptoms.  What are the symptoms of COVID-19?  The most common symptoms are cough, fever and trouble breathing. Less common symptoms include headache, body aches, fatigue (feeling very tired), chills, sore throat, stuffy or runny nose, diarrhea (loose poop), loss of taste or smell, belly pain, and nausea or vomiting (feeling sick to your stomach or throwing up).  Where can I get more information?  [facility] -- About COVID-19: [email or web address]  CDC -- What to Do If You're Sick: www.cdc.gov/coronavirus/2019-ncov/about/steps-when-sick.html  CDC -- Ending Home Isolation: www.cdc.gov/coronavirus/2019-ncov/hcp/disposition-in-home-patients.html  [facility] -- Caring for Someone: www.cdc.gov/coronavirus/2019-ncov/if-you-are-sick/care-for-someone.html  [facility] -- Interim Guidance for Hospital Discharge to Home: www.health.[facility].[place]/diseases/coronavirus/hcp/hospdischarge.pdf  [facility] clinical trials (COVID-19 research studies): clinicalaffairs.[facility]-clinical-trials  Below are the COVID-19 hotlines at the [place] [facility] ([facility]). Interpreters are available.  For health questions: Call 220-611-2905 or 1-449.188.8279 (7 a.m. to 7 p.m.)  For questions about schools and childcare: Call 083-503-3802 or 1-919.265.1983 (7 a.m. to 7 p.m.)    Diagnosis: Acute upper respiratory infection, unspecified  Diagnosis ICD:  J06.9

## 2020-10-09 ENCOUNTER — VIRTUAL VISIT (OUTPATIENT)
Dept: FAMILY MEDICINE | Facility: OTHER | Age: 30
End: 2020-10-09

## 2020-10-09 NOTE — PROGRESS NOTES
"Date: 10/09/2020 11:38:59  Clinician: Vida Persaud  Clinician NPI: [id number]  Patient: Justin Joyce  Patient : 1990  Patient Address: [facility] [address], [place] [address]  Patient Phone: [phone]  Visit Protocol: URI  Patient Summary:  Justin is a 29 year old ( : 1990 ) male who initiated a [facility] Visit for COVID-19 (Coronavirus) evaluation and screening. When asked the question \"Please sign me up to receive news, health information and promotions. \", Justin responded \"No\".    When asked when his symptoms started, Justin reported that he does not have any symptoms.   He denies taking antibiotic medication in the past month and having recent facial or sinus surgery in the past 60 days.    Pertinent COVID-19 (Coronavirus) information  In the past 14 days, Justin has not worked in a congregate living setting.   He does not work or volunteer as healthcare worker or a  and does not work or volunteer in a healthcare facility.   Justin also has not lived in a congregate living setting in the past 14 days. He does not live with a healthcare worker.   Justin has had a close contact with a laboratory-confirmed COVID-19 patient in the last 14 days. Additional information about contact with COVID-19 (Coronavirus) patient as reported by the patient (free text): This person tested positive today.  I was exposed to this person daily for the last month daily during which time he was coughing.  We were both masked, but spent much time closer than 6 ft. from each other during the last month.   Patient reported they are not living in the same household with a COVID-19 positive patient.  Patient was in an enclosed space for greater than 15 minutes with a COVID-19 patient.  Since 2019, Justin and has had upper respiratory infection (URI) or influenza-like illness. Has not been diagnosed with lab-confirmed COVID-19 test      Date(s) of previous URI or influenza-like illness " (free-text): February 15, 2020     Symptoms Justin experienced during previous URI or influenza-like illness as reported by the patient (free-text): Fever, cough, fatigue, headache        Pertinent medical history  Justin needs a return to work/school note.   Weight: 162 lbs   Justin does not smoke or use smokeless tobacco.   Weight: 162 lbs  Reason for repeat visit for the same protocol within 24 hours:  I was recommended I do not get a COVID test yesterday, but now I am certain I was exposed to a COVID positive person at work.  I would like a test immediately.  See the History of referred by protocol and completed visits section for details on previous visits (visits currently in queue to be diagnosed will not appear in this section).    MEDICATIONS: albuterol sulfate inhalation, cetirizine oral, Advair Diskus inhalation, ALLERGIES: Penicillins  Clinician Response:  Dear Justin,   Based on your exposure to COVID-19 (coronavirus), we would like to test you for this virus.  1. Please call [phone] to schedule your visit. Explain that you were referred by [facility] to have a COVID-19 test. Be ready to share your [facility] visit ID number.  The following will serve as your written order for this COVID Test, ordered by me, for the indication of suspected COVID [Z20.828]: The test will be ordered in [employer], our electronic health record, after you are scheduled. It will show as ordered and authorized by Otilio Broussard MD.  Order: COVID-19 (coronavirus) PCR for [facility]YMPTOMATIC EXPOSURE [facility].  If you know you have had close contact with someone who tested positive, you should be quarantined for 14 days after this exposure. You should stay in quarantine for the14 days even if the covid test is negative, the optimal time to test after exposure is 5-7 days from the exposure  Quarantine means   What should I do?  For safety, it's very important to follow these rules. Do this for 14 days after the date you were last  exposed to the virus..  Stay home and away from others. Don't go to school or anywhere else. Generally quarantine means staying home from work but there are some exceptions to this. Please contact your workplace.   No hugging, kissing or shaking hands.  Don't let anyone visit.  Cover your mouth and nose with a mask, tissue or washcloth to avoid spreading germs.  Wash your hands and face often. Use soap and water.  What are the symptoms of COVID-19?  The most common symptoms are cough, fever and trouble breathing. Less common symptoms include headache, body aches, fatigue (feeling very tired), chills, sore throat, stuffy or runny nose, diarrhea (loose poop), loss of taste or smell, belly pain, and nausea or vomiting (feeling sick to your stomach or throwing up).  After 14 days, if you have still don't have symptoms, you likely don't have this virus.  If you develop symptoms, follow these guidelines.  If you're normally healthy: Please start another [facility] visit to report your symptoms. Go to [email or web address].  If you have a serious health problem (like cancer, heart failure, an organ transplant or kidney disease): Call your specialty clinic. Let them know that you might have COVID-19.  2. When it's time for your COVID test:  Stay at least 6 feet away from others. (If someone will drive you to your test, stay in the backseat, as far away from the  as you can.)  Cover your mouth and nose with a mask, tissue or washcloth.  Go straight to the testing site. Don't make any stops on the way there or back.  Please note  Caregivers in these groups are at risk for severe illness due to COVID-19:  o People 65 years and older  o People who live in a nursing home or long-term care facility  o People with chronic disease (lung, heart, cancer, diabetes, kidney, liver, immunologic)  o People who have a weakened immune system, including those who:  Are in cancer treatment  Take medicine that weakens the immune system, such as  corticosteroids  Had a bone marrow or organ transplant  Have an immune deficiency  Have poorly controlled HIV or AIDS  Are obese (body mass index of 40 or higher)  Smoke regularly  Where can I get more information?  [facility] -- About COVID-19: [email or web address]  CDC -- What to Do If You're Sick: www.cdc.gov/coronavirus/2019-ncov/about/steps-when-sick.html  CDC -- Ending Home Isolation: www.cdc.gov/coronavirus/2019-ncov/hcp/disposition-in-home-patients.html  [facility] -- Caring for Someone: www.cdc.gov/coronavirus/2019-ncov/if-you-are-sick/care-for-someone.html  [facility] -- Interim Guidance for Hospital Discharge to Home: www.[facility].[place]./diseases/coronavirus/hcp/hospdischarge.pdf  [facility] clinical trials (COVID-19 research studies): clinicalaffairs.[facility]-clinical-trials  Below are the COVID-19 hotlines at the [facility] of [facility] ([facility]). Interpreters are available.  For health questions: Call 039-489-2032 or 1-921.367.6895 (7 a.m. to 7 p.m.)  For questions about schools and childcare: Call 093-204-4777 or 1-199.285.8943 (7 a.m. to 7 p.m.)    Diagnosis: Contact with and (suspected) exposure to other viral communicable diseases  Diagnosis ICD: Z20.828

## 2021-04-20 DIAGNOSIS — J45.30 MILD PERSISTENT ASTHMA, UNSPECIFIED WHETHER COMPLICATED: Primary | ICD-10-CM

## 2021-04-22 RX ORDER — CETIRIZINE HYDROCHLORIDE 10 MG/1
10 TABLET ORAL DAILY
Qty: 30 TABLET | Refills: 1 | Status: SHIPPED | OUTPATIENT
Start: 2021-04-22

## 2021-05-18 DIAGNOSIS — J45.30 MILD PERSISTENT ASTHMA, UNSPECIFIED WHETHER COMPLICATED: ICD-10-CM

## 2021-05-18 RX ORDER — CETIRIZINE HYDROCHLORIDE 10 MG/1
TABLET ORAL
Qty: 30 TABLET | Refills: 1 | OUTPATIENT
Start: 2021-05-18

## 2021-05-19 NOTE — TELEPHONE ENCOUNTER
cetirizine (ZYRTEC) 10 MG tablet 30 tablet 1 4/22/2021  No   Sig - Route: Take 1 tablet (10 mg) by mouth daily - Oral     REFILL ON FILE/TOO SOON

## 2021-08-28 ENCOUNTER — APPOINTMENT (OUTPATIENT)
Dept: GENERAL RADIOLOGY | Facility: CLINIC | Age: 31
End: 2021-08-28
Attending: EMERGENCY MEDICINE
Payer: COMMERCIAL

## 2021-08-28 ENCOUNTER — HOSPITAL ENCOUNTER (EMERGENCY)
Facility: CLINIC | Age: 31
Discharge: HOME | End: 2021-08-29
Attending: EMERGENCY MEDICINE | Admitting: EMERGENCY MEDICINE
Payer: COMMERCIAL

## 2021-08-28 DIAGNOSIS — M94.0 COSTOCHONDRITIS: ICD-10-CM

## 2021-08-28 DIAGNOSIS — R07.81 PLEURITIC CHEST PAIN: ICD-10-CM

## 2021-08-28 PROCEDURE — 99284 EMERGENCY DEPT VISIT MOD MDM: CPT | Mod: 25 | Performed by: EMERGENCY MEDICINE

## 2021-08-28 PROCEDURE — 93005 ELECTROCARDIOGRAM TRACING: CPT | Performed by: EMERGENCY MEDICINE

## 2021-08-28 PROCEDURE — 71045 X-RAY EXAM CHEST 1 VIEW: CPT

## 2021-08-28 PROCEDURE — 93010 ELECTROCARDIOGRAM REPORT: CPT | Performed by: EMERGENCY MEDICINE

## 2021-08-28 PROCEDURE — 71045 X-RAY EXAM CHEST 1 VIEW: CPT | Mod: 26 | Performed by: RADIOLOGY

## 2021-08-28 RX ORDER — IBUPROFEN 100 MG/5ML
600 SUSPENSION, ORAL (FINAL DOSE FORM) ORAL ONCE
Status: COMPLETED | OUTPATIENT
Start: 2021-08-28 | End: 2021-08-29

## 2021-08-28 RX ORDER — ALBUTEROL SULFATE 90 UG/1
2 AEROSOL, METERED RESPIRATORY (INHALATION) ONCE
Status: DISCONTINUED | OUTPATIENT
Start: 2021-08-28 | End: 2021-08-29 | Stop reason: HOSPADM

## 2021-08-28 ASSESSMENT — MIFFLIN-ST. JEOR: SCORE: 1789.07

## 2021-08-29 VITALS
WEIGHT: 172 LBS | OXYGEN SATURATION: 98 % | SYSTOLIC BLOOD PRESSURE: 143 MMHG | HEART RATE: 74 BPM | RESPIRATION RATE: 16 BRPM | DIASTOLIC BLOOD PRESSURE: 103 MMHG | HEIGHT: 73 IN | TEMPERATURE: 98.2 F | BODY MASS INDEX: 22.8 KG/M2

## 2021-08-29 LAB
ATRIAL RATE - MUSE: 59 BPM
DIASTOLIC BLOOD PRESSURE - MUSE: NORMAL MMHG
INTERPRETATION ECG - MUSE: NORMAL
P AXIS - MUSE: 55 DEGREES
PR INTERVAL - MUSE: 130 MS
QRS DURATION - MUSE: 104 MS
QT - MUSE: 404 MS
QTC - MUSE: 399 MS
R AXIS - MUSE: 50 DEGREES
SYSTOLIC BLOOD PRESSURE - MUSE: NORMAL MMHG
T AXIS - MUSE: 52 DEGREES
VENTRICULAR RATE- MUSE: 59 BPM

## 2021-08-29 PROCEDURE — 250N000013 HC RX MED GY IP 250 OP 250 PS 637: Performed by: EMERGENCY MEDICINE

## 2021-08-29 RX ADMIN — IBUPROFEN 600 MG: 200 SUSPENSION ORAL at 00:45

## 2021-08-29 ASSESSMENT — ENCOUNTER SYMPTOMS
SHORTNESS OF BREATH: 0
COUGH: 0
FEVER: 0
WHEEZING: 0

## 2021-08-29 NOTE — ED TRIAGE NOTES
"Pt arrived ambulatory to triage w/ progressively worse \"sharp, pressure\" pain in chest. States as mid/ slightly left sternum,non radiating, \"6-8/10\" in intensity. Endorses that pain worsens when he leans forward as well as when standing up. Pain started @~1600 when pt was shopping. Endorses dyspnea, pt currently not tachypneic. 98% RA. Compares current symptoms to past pleurisy dx. Covid vaccinated.   "

## 2021-08-29 NOTE — ED PROVIDER NOTES
ED Provider Note  [facility]      History     Chief Complaint   Patient presents with     Shortness of Breath     The history is provided by the patient, medical records and the spouse.     Justin Joyce is a 30 year old male with history of asthma who presents to the ED with chest pain. Patient reports that he woke up feeling fine this morning and around 3-4 pm had the sudden onset of left sided chest pain. He reports that the pain is a pressure that is non-radiating and becomes more of a sharp pain by deep inspiration and certain positions. Pain initially got gradually worse, but has now gotten gradually better while in the waiting room. Pain is not worse with exertion. He has no pain while sitting or laying and breathing more shallow. He does have asthma that is usually triggered by exercise or allergies, but is usually well controlled with Advair, Flonase, and Zyrtec. He rarely has to use his rescue inhaler. This does not feel like an asthma exacerbation and he is not short of breath or wheezing so he did not use his albuterol prior to arrival. He does note one previous episode 8 years ago in which he had the sudden onset of left sided chest pain where he was seen in the ED and prescribed Prednisone for pleurisy. His symptoms went away on their own in a few days. He states that at that time he had not been recently sick. This pain feels very similar. He denies recent cough or fever. No leg pain or swelling. He is fully vaccinated against Covid. He has no history of clotting disorders.     Past Medical History  Past Medical History:   Diagnosis Date     Food allergies      Hyperlipidaemia LDL goal < 160      Mild persistent asthma      Pleurisy 2012    hospitalized for 2 days     Seasonal allergies      Past Surgical History:   Procedure Laterality Date     HC TOOTH EXTRACTION W/FORCEP       albuterol (PROAIR HFA/PROVENTIL HFA/VENTOLIN HFA) 108 (90 Base) MCG/ACT inhaler  cetirizine  "(ZYRTEC) 10 MG tablet  fluticasone-salmeterol (ADVAIR DISKUS) 100-50 MCG/DOSE inhaler  EPINEPHrine (EPIPEN/ADRENACLICK/OR ANY BX GENERIC EQUIV) 0.3 MG/0.3ML injection 2-pack  fluticasone (FLONASE) 50 MCG/ACT nasal spray      Allergies   Allergen Reactions     Bees Anaphylaxis     Nuts Anaphylaxis     Seafood Anaphylaxis     Orange Hives     Penicillins Unknown     Seasonal Allergies Itching     Family History  Family History   Problem Relation Age of Onset     Lipids Father      Asthma Father      Allergies Father      Lipids Paternal Grandmother      Cancer Paternal Grandmother         ovarian     Allergies Mother      Cancer Maternal Grandmother      C.A.D. Maternal Grandfather      Cancer Maternal Grandfather         lung     Social History   Social History     Tobacco Use     Smoking status: Never Smoker     Smokeless tobacco: Never Used   Substance Use Topics     Alcohol use: Yes     Alcohol/week: 7.0 standard drinks     Types: 7 Cans of beer per week     Drug use: Yes     Types: Marijuana     Comment: edibles. states as weekly occurrence.       Past medical history, past surgical history, medications, allergies, family history, and social history were reviewed with the patient. No additional pertinent items.       Review of Systems   Constitutional: Negative for fever.   Respiratory: Negative for cough, shortness of breath and wheezing.    Cardiovascular: Positive for chest pain. Negative for leg swelling.   All other systems reviewed and are negative.    A complete review of systems was performed with pertinent positives and negatives noted in the HPI, and all other systems negative.    Physical Exam   BP: (!) 143/103  Pulse: 74  Temp: 98.2  F (36.8  C)  Resp: 16  Height: 185.4 cm (6' 1\")  Weight: 78 kg (172 lb)  SpO2: 98 %  Physical Exam  Vitals and nursing note reviewed.   Constitutional:       General: He is not in acute distress.     Appearance: Normal appearance. He is well-developed and normal weight. " He is not ill-appearing or diaphoretic.   HENT:      Head: Normocephalic and atraumatic.      Nose: Nose normal.   Eyes:      General: No scleral icterus.     Conjunctiva/sclera: Conjunctivae normal.   Cardiovascular:      Rate and Rhythm: Normal rate and regular rhythm.   Pulmonary:      Effort: Pulmonary effort is normal. No tachypnea, accessory muscle usage or respiratory distress.      Breath sounds: No stridor. Decreased breath sounds present. No wheezing, rhonchi or rales.   Chest:      Chest wall: Tenderness present. No deformity or crepitus.   Abdominal:      General: There is no distension.   Musculoskeletal:         General: No deformity or signs of injury. Normal range of motion.      Cervical back: Normal range of motion and neck supple. No rigidity.      Right lower leg: No edema.      Left lower leg: No edema.   Skin:     General: Skin is warm and dry.      Coloration: Skin is not cyanotic, jaundiced or pale.      Findings: No rash.   Neurological:      General: No focal deficit present.      Mental Status: He is alert and oriented to person, place, and time.   Psychiatric:         Mood and Affect: Mood is anxious.         Behavior: Behavior normal.         Thought Content: Thought content normal.         ED Course      Procedures            EKG Interpretation:      Interpreted by Eliza Khoury MD  Time reviewed: 2324  Symptoms at time of EKG: pleurisy   Rhythm: sinus bradycardia  Rate: Bradycardia and 50-60  Axis: Normal  Ectopy: none  Conduction: normal  ST Segments/ T Waves: No ST-T wave changes  Q Waves: none  Comparison to prior: decreased rate    Clinical Impression: non-specific EKG, no pericarditis                   Results for orders placed or performed during the hospital encounter of 08/28/21   XR Chest Port 1 View     Status: None    Narrative    EXAM: XR CHEST PORT 1 VIEW  LOCATION: [facility]  DATE/TIME: 8/28/2021 11:41 PM    INDICATION:  chest pain  COMPARISON: None.      Impression    IMPRESSION: Negative chest.   EKG 12 lead     Status: None (Preliminary result)   Result Value Ref Range    Systolic Blood Pressure  mmHg    Diastolic Blood Pressure  mmHg    Ventricular Rate 59 BPM    Atrial Rate 59 BPM    MT Interval 130 ms    QRS Duration 104 ms     ms    QTc 399 ms    P Axis 55 degrees    R AXIS 50 degrees    T Axis 52 degrees    Interpretation ECG Sinus bradycardia  Otherwise normal ECG        Medications   albuterol (PROAIR HFA/PROVENTIL HFA/VENTOLIN HFA) 108 (90 Base) MCG/ACT inhaler 2 puff (2 puffs Inhalation Not Given 8/29/21 0044)   ibuprofen (ADVIL/MOTRIN) suspension 600 mg (600 mg Oral Given 8/29/21 0045)        Assessments & Plan (with Medical Decision Making)   Justin Joyce is a 30 year old male with history of asthma who presents to the ED with chest pain.    Ddx: Pericarditis, asthma exacerbation, costochondritis, nonspecific pleurisy, viral URI    Pt reports having pleuritic chest pain started suddenly this afternoon.  He does not have associated shortness of breath, cough, fever.  Occasionally the pain was worse when he leaned forward but currently he denies positional changes in the pain.  He does have pain with palpation of the chest wall.  He has had similar symptoms around 8 years ago.  He had a full work-up that was normal and the pain resolved spontaneously in a few days.  Not had wheezing or shortness of breath so he does not think this is like an asthma exacerbation.  He has been fully vaccinated against COVID-19.  I think a screening test is indicated since patient has no cough or fever or other symptoms of a viral syndrome.  He has reproducible chest pain with AP compression of the chest.  EKG shows sinus bradycardia with a ventricular rate of 59.  No acute ischemic changes.  No changes to indicate pericarditis.  X-ray of the chest negative.  Patient is very well-appearing with normal vitals.  He is PERC  negative.  No risk factors for pulmonary embolism.  I discussed obtaining a troponin but the patient preferred to try ibuprofen to see if that helps with his pain.  He also uses home inhaler of albuterol.  He did not notice a difference after the albuterol.    Patient took ibuprofen and noted shortly afterwards that his pain had resolved.  He was comfortable discharging home with plan to continue treating pain with NSAIDs.  Since he had no wheezing or hypoxia I do not think prednisone is indicated.  Recommended he follow-up with his primary care provider if his symptoms have not resolved in the next 2 to 3 days.  Detailed return precautions provided.    I have reviewed the nursing notes. I have reviewed the findings, diagnosis, plan and need for follow up with the patient.    New Prescriptions    No medications on file       Final diagnoses:   Costochondritis   Pleuritic chest pain   I, Guillermina Castillo, am serving as a trained medical scribe to document services personally performed by Eliza Khoury MD, based on the provider's statements to me.     I, Eliza Khoury MD, was physically present and have reviewed and verified the accuracy of this note documented by Guillermina Castillo.      --  Eliza Khoury MD  [facility] EMERGENCY DEPARTMENT  8/28/2021     Eliza Khoury MD  08/29/21 0150

## 2021-08-29 NOTE — DISCHARGE INSTRUCTIONS
Please make an appointment to follow up with Your Primary Care Provider in 3-4 days as needed.    Take 600 mg ibuprofen every 8 hours, for pain and inflammation.  Take this on schedule, for approximately 1 week or until resolution of symptoms.    Take this medication with food to prevent stomach upset.  If you taking a chronic antacid medication, make sure to take this while you are taking this medication.    Follow-up with her primary care doctor in 1-2 days if your symptoms have not resolved.     Return to the emergency department if you develop worsening chest pain, tightness of breath, coughing, fainting, fever.

## 2021-09-07 DIAGNOSIS — J45.30 MILD PERSISTENT ASTHMA, UNSPECIFIED WHETHER COMPLICATED: ICD-10-CM

## 2021-09-10 RX ORDER — ALBUTEROL SULFATE 90 UG/1
2 AEROSOL, METERED RESPIRATORY (INHALATION) EVERY 6 HOURS
Qty: 18 G | Refills: 1 | Status: SHIPPED | OUTPATIENT
Start: 2021-09-10

## 2021-09-10 NOTE — TELEPHONE ENCOUNTER
Last Clinic Visit: 10/6/2020  [facility] [place]    Appointment and Test(s)- ACT (asthma control test) past due.  Melanie refill of ProAir Inhaler was sent for a 1 inhaler plus 1 added refill and FYI to [facility] clinic.

## 2021-10-03 ENCOUNTER — HEALTH MAINTENANCE LETTER (OUTPATIENT)
Age: 31
End: 2021-10-03

## 2021-11-28 ENCOUNTER — HEALTH MAINTENANCE LETTER (OUTPATIENT)
Age: 31
End: 2021-11-28

## 2022-09-10 ENCOUNTER — HEALTH MAINTENANCE LETTER (OUTPATIENT)
Age: 32
End: 2022-09-10

## 2023-01-21 ENCOUNTER — HEALTH MAINTENANCE LETTER (OUTPATIENT)
Age: 33
End: 2023-01-21

## 2024-02-18 ENCOUNTER — HEALTH MAINTENANCE LETTER (OUTPATIENT)
Age: 34
End: 2024-02-18